# Patient Record
Sex: FEMALE | Race: ASIAN | NOT HISPANIC OR LATINO
[De-identification: names, ages, dates, MRNs, and addresses within clinical notes are randomized per-mention and may not be internally consistent; named-entity substitution may affect disease eponyms.]

---

## 2023-09-18 PROBLEM — Z00.00 ENCOUNTER FOR PREVENTIVE HEALTH EXAMINATION: Status: ACTIVE | Noted: 2023-09-18

## 2023-09-22 ENCOUNTER — APPOINTMENT (OUTPATIENT)
Dept: ORTHOPEDIC SURGERY | Facility: CLINIC | Age: 23
End: 2023-09-22
Payer: COMMERCIAL

## 2023-09-22 ENCOUNTER — NON-APPOINTMENT (OUTPATIENT)
Age: 23
End: 2023-09-22

## 2023-09-22 VITALS — BODY MASS INDEX: 21.26 KG/M2 | HEIGHT: 63 IN | WEIGHT: 120 LBS

## 2023-09-22 DIAGNOSIS — S99.919A UNSPECIFIED INJURY OF UNSPECIFIED ANKLE, INITIAL ENCOUNTER: ICD-10-CM

## 2023-09-22 PROCEDURE — 99204 OFFICE O/P NEW MOD 45 MIN: CPT

## 2023-09-22 PROCEDURE — 73610 X-RAY EXAM OF ANKLE: CPT | Mod: LT

## 2023-09-25 PROBLEM — S99.919A ANKLE INJURY: Status: ACTIVE | Noted: 2023-09-25

## 2023-10-20 ENCOUNTER — NON-APPOINTMENT (OUTPATIENT)
Age: 23
End: 2023-10-20

## 2023-10-23 ENCOUNTER — NON-APPOINTMENT (OUTPATIENT)
Age: 23
End: 2023-10-23

## 2023-10-24 ENCOUNTER — NON-APPOINTMENT (OUTPATIENT)
Age: 23
End: 2023-10-24

## 2023-12-22 ENCOUNTER — APPOINTMENT (OUTPATIENT)
Dept: ORTHOPEDIC SURGERY | Facility: CLINIC | Age: 23
End: 2023-12-22
Payer: COMMERCIAL

## 2023-12-22 VITALS
BODY MASS INDEX: 20.49 KG/M2 | DIASTOLIC BLOOD PRESSURE: 69 MMHG | HEIGHT: 64 IN | HEART RATE: 75 BPM | WEIGHT: 120 LBS | SYSTOLIC BLOOD PRESSURE: 102 MMHG

## 2023-12-22 PROCEDURE — 29075 APPL CST ELBW FNGR SHORT ARM: CPT | Mod: LT

## 2023-12-22 PROCEDURE — 73110 X-RAY EXAM OF WRIST: CPT | Mod: LT

## 2023-12-22 PROCEDURE — 99214 OFFICE O/P EST MOD 30 MIN: CPT | Mod: 25

## 2023-12-22 NOTE — DISCUSSION/SUMMARY
[FreeTextEntry1] :  She has findings consistent with a nondisplaced intra-articular left distal radius fracture after a fall while snowboarding 1 week ago.   I had a discussion with the patient regarding today's visit, the prognosis of this diagnosis, and treatment recommendations and options. At this time, I recommended cast immobilization.   The patient has agreed to the above plan of management and has expressed full understanding. All questions were fully answered to the patient's satisfaction.   My cumulative time spent on this visit included: Preparation for the visit, review of the medical records, review of pertinent diagnostic studies, examination and counseling of the patient on the above diagnosis, treatment plan and prognosis, orders of diagnostic tests, medication and/or appropriate procedures and documentation in the medical records of today's visit.

## 2023-12-22 NOTE — ADDENDUM
[FreeTextEntry1] :  I, Pradip Morrison, acted solely as a scribe for Dr. Grant on this date on 12/22/2023.

## 2023-12-22 NOTE — CONSULT LETTER
[Dear  ___] : Dear  [unfilled], [Consult Letter:] : I had the pleasure of evaluating your patient, [unfilled]. [Please see my note below.] : Please see my note below. [Consult Closing:] : Thank you very much for allowing me to participate in the care of this patient.  If you have any questions, please do not hesitate to contact me. [Sincerely,] : Sincerely, [FreeTextEntry3] : Bharath Grant M.D. Surgery of the Hand & Upper Extremity Orthopaedic Surgery Chief, Hand Service, [facility] Director, Hand  of Orthopedic Surgery, [facility] [employer] [facility] at hospitals/[facility]Email: [email or web address] Phone: [phone]

## 2023-12-22 NOTE — END OF VISIT
[FreeTextEntry3] : This note was written by Pradip Morrison on 12/22/2023 acting solely as a scribe for Dr. Bharath Grant.   All medical record entries made by the Scribe were at my, Dr. Bharath Grant, direction and personally dictated by me on 12/22/2023. I have personally reviewed the chart and agree that the record accurately reflects my personal performance of the history, physical exam, assessment and plan.

## 2023-12-22 NOTE — PROCEDURE
[FreeTextEntry1] : She was placed into a well-padded and well-molded left short arm fiberglass cast. She was instructed on cast care, elevation and range of motion exercises to the digits. She will follow-up in 4 weeks for x-rays out of plaster.

## 2023-12-22 NOTE — PHYSICAL EXAM
[de-identified] : - Constitutional: This is a female in no obvious distress. She is accompanied by her mother.  - Psych: Patient is alert and oriented to person, place and time.  Patient has a normal mood and affect. - Cardiovascular: Normal pulses throughout the upper extremities.  No significant varicosities are noted in the upper extremities. - Neuro: Strength and sensation are intact throughout the upper extremities.  Patient has normal coordination. - Respiratory:  Patient exhibits no evidence of shortness of breath or difficulty breathing. - Skin: No rashes, lesions, or other abnormalities are noted in the upper extremities. ---  Examination of her left wrist and hand after the splint was removed demonstrates swelling and tenderness along the distal radius.  There are no breaks in the skin.  She has full flexion and extension of the digits.  She is neurovascularly intact distally. [de-identified] :  PA, lateral, and oblique radiographs of the left wrist demonstrate a nondisplaced distal radius fracture that evolves the articular surface.

## 2024-01-10 ENCOUNTER — NON-APPOINTMENT (OUTPATIENT)
Age: 24
End: 2024-01-10

## 2024-01-12 PROBLEM — S52.572A OTHER CLOSED INTRA-ARTICULAR FRACTURE OF DISTAL END OF LEFT RADIUS, INITIAL ENCOUNTER: Status: ACTIVE | Noted: 2023-12-22

## 2024-01-19 ENCOUNTER — APPOINTMENT (OUTPATIENT)
Dept: ORTHOPEDIC SURGERY | Facility: CLINIC | Age: 24
End: 2024-01-19
Payer: COMMERCIAL

## 2024-01-19 DIAGNOSIS — S52.572A OTHER INTRAARTICULAR FRACTURE OF LOWER END OF LEFT RADIUS, INITIAL ENCOUNTER FOR CLOSED FRACTURE: ICD-10-CM

## 2024-01-19 PROCEDURE — 73110 X-RAY EXAM OF WRIST: CPT | Mod: LT

## 2024-01-19 PROCEDURE — 99213 OFFICE O/P EST LOW 20 MIN: CPT

## 2024-01-19 NOTE — DISCUSSION/SUMMARY
[FreeTextEntry1] : I had a discussion regarding today's visit, the diagnosis and treatment recommendations and options.  We also discussed changes since the last visit.  At this time, she was provided with a left carpal tunnel splint to wear for support. She was instructed on range of motion exercises. I recommended for her to have calcium and vitamin D blood work done with her PCP.  She will follow-up in 2 weeks.  The patient has agreed to the above plan of management and has expressed full understanding.  All questions were fully answered to the patient's satisfaction.  My cumulative time spent on today's visit included: Preparation for the visit, review of the medical records, review of pertinent diagnostic studies, examination and counseling of the patient on the above diagnosis, treatment plan and prognosis, orders of diagnostic tests, medications and/or appropriate procedures and documentation in the medical records of today's visit.

## 2024-01-19 NOTE — HISTORY OF PRESENT ILLNESS
[FreeTextEntry1] : 5 weeks status post fall while snowboarding resulting in nondisplaced intra-articular left distal radius fracture.  She was seen in the office 4 weeks ago and she was casted  She is overall doing well and rates her pain as a 2-3/10. She denies any numbness or tingling.   She was accompanied by her Mother today.

## 2024-01-19 NOTE — ADDENDUM
[FreeTextEntry1] :  I, Pradip Morrison, acted solely as a scribe for Dr. Grant on this date on 01/19/2024.

## 2024-01-19 NOTE — END OF VISIT
[FreeTextEntry3] : This note was written by Pradip Morrison on 01/19/2024 acting solely as a scribe for Dr. Bharath Grant.   All medical record entries made by the Scribe were at my, Dr. Bharath Grant, direction and personally dictated by me on 01/19/2024. I have personally reviewed the chart and agree that the record accurately reflects my personal performance of the history, physical exam, assessment and plan.

## 2024-01-19 NOTE — PHYSICAL EXAM
[de-identified] : - Constitutional: This is a female in no obvious distress. She is accompanied by her mother.  - Psych: Patient is alert and oriented to person, place and time.  Patient has a normal mood and affect. - Cardiovascular: Normal pulses throughout the upper extremities.  No significant varicosities are noted in the upper extremities. - Neuro: Strength and sensation are intact throughout the upper extremities.  Patient has normal coordination. - Respiratory:  Patient exhibits no evidence of shortness of breath or difficulty breathing. - Skin: No rashes, lesions, or other abnormalities are noted in the upper extremities. ---  Examination of her left wrist and hand after the cast was removed demonstrates no residual swelling or tenderness along the distal radius dorsally.  She has limitation of terminal flexion and extension.  She has full flexion and extension of the digits.  She is neurovascularly intact distally. [de-identified] : PA, lateral, and oblique radiographs of the left wrist demonstrate distal radius fracture is healed and in good alignment.

## 2024-01-29 ENCOUNTER — APPOINTMENT (OUTPATIENT)
Dept: PSYCHIATRY | Facility: CLINIC | Age: 24
End: 2024-01-29
Payer: COMMERCIAL

## 2024-01-29 PROCEDURE — 90791 PSYCH DIAGNOSTIC EVALUATION: CPT

## 2024-01-29 NOTE — DISCUSSION/SUMMARY
[FreeTextEntry8] : Ms. Fernando presents for consultation with cognitive and behavioral concerns that are impacting her daily life and raise concern for the presence of ADHD. Her history of difficulty focusing across settings (e.g., school, work, recreationally), supported by her mom reporting noticeable distractibility, and disorganization at work, suggests she does meet criteria for ADHD, inattentive type. Despite these difficulties, Ms. Fernando has been able to be successful academically and maintain her occupation, suggesting that her ADHD presentation is mild. [FreeTextEntry4] : Ms. Fernando was referred to a psychiatrist for potentially taking medication to help manage her ADHD, as well as a referral for executive functioning coaching to help build better organizational skills.

## 2024-01-29 NOTE — REASON FOR VISIT
[Consultation for neuropsychological evaluation] : Consultation for neuropsychological evaluation [Patient with collateral] : Patient with collateral  [Mother] : mother

## 2024-01-29 NOTE — HISTORY OF PRESENT ILLNESS
[FreeTextEntry1] : Erin Fernando is a 23-year-old woman with no significant medical history, referred for neuropsychological consultation due to attention concerns.   She reported specific difficulties in the following areas: -	Attention - long standing difficulty with focusing on schoolwork and now in her career; difficulty studying, even for interesting subjects; will zone off while working or talking to others; needs to rely on repetition of information or taking notes; poor attention to details -	Executive Functioning - procrastinates on completing work until up against a deadline, and when making plans with friends; poor organization in personal surroundings and in the work setting; difficulty managing multiple projects at work -	Language - has some difficulty forming and expressing thoughts; needs others to repeat themselves often -	Memory - forgets to complete minor tasks, especially if not written down -	Behavior - more fidgeting recently (e.g., tapping foot, touching eye lid) -	Functioning - relies on mom to manage appointments for her  Developmental History: WNL  Medical History: Asthma and vomiting as a child  Psychiatric History / Mood: Generalized anxiety for situations; improved in college  Substance Use: None  Medications: None  Family History: None  Educational / Work History: 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 Graduated in 2022 with a 3.2 GPA. 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   Psychosocial History: Lives in [place] with mom and dad. Ethnicity is Chinese. English is primary language. Has many friends.

## 2024-04-05 ENCOUNTER — APPOINTMENT (OUTPATIENT)
Dept: INFECTIOUS DISEASE | Facility: CLINIC | Age: 24
End: 2024-04-05
Payer: SELF-PAY

## 2024-04-05 DIAGNOSIS — Z71.84 ENC FOR HEALTH COUNSELING RELATED TO TRAVEL: ICD-10-CM

## 2024-04-05 PROCEDURE — 90472 IMMUNIZATION ADMIN EACH ADD: CPT | Mod: NC

## 2024-04-05 PROCEDURE — 90690 TYPHOID VACCINE ORAL: CPT

## 2024-04-05 PROCEDURE — 90717 YELLOW FEVER VACCINE SUBQ: CPT

## 2024-04-05 PROCEDURE — 99401 PREV MED CNSL INDIV APPRX 15: CPT | Mod: 25

## 2024-04-05 PROCEDURE — 90471 IMMUNIZATION ADMIN: CPT | Mod: NC

## 2024-04-05 RX ORDER — AZITHROMYCIN 250 MG/1
250 TABLET, FILM COATED ORAL
Qty: 4 | Refills: 0 | Status: ACTIVE | COMMUNITY
Start: 2024-04-05 | End: 1900-01-01

## 2024-04-05 RX ORDER — ATOVAQUONE AND PROGUANIL HYDROCHLORIDE 250; 100 MG/1; MG/1
250-100 TABLET, FILM COATED ORAL DAILY
Qty: 25 | Refills: 0 | Status: ACTIVE | COMMUNITY
Start: 2024-04-05 | End: 1900-01-01

## 2024-09-06 ENCOUNTER — APPOINTMENT (OUTPATIENT)
Dept: ORTHOPEDIC SURGERY | Facility: CLINIC | Age: 24
End: 2024-09-06

## 2024-09-13 ENCOUNTER — APPOINTMENT (OUTPATIENT)
Dept: ORTHOPEDIC SURGERY | Facility: CLINIC | Age: 24
End: 2024-09-13